# Patient Record
(demographics unavailable — no encounter records)

---

## 2024-12-19 NOTE — ASSESSMENT
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  Patient was referred for management.  Patient has completed her childbearing age and desire.  Discussed with patient the management option including surgery for removal of the uterus with cervix bilateral fallopian tubes and preservation of the ovaries.  Explained to the patient such procedure will result in cessation of menses however she will not be menopausal.  They are evidence that post hysterectomy, patient may experience early menopause due to ovarian failure.  Additionally we discussed nonsurgical option however the referring physician and the patient agreed and wished to proceed with definitive management.  Explained to the patient surgery will be performed by minimally invasive/robotic approach, however surgery has inherent risks including but not limited to infection, bleeding which may require blood transfusion, DVT and or PE and although all measures will be taken to minimize these risks that cannot be eliminated 100% of the time.  Additionally there is a risk of injury to surrounding structures such as bladder, ureters, bowel or blood vessels and these injuries will be repaired immediately however some injuries may not present until postoperatively and may require reoperations.  The benefit of surgery is to exclude invasive cancer of the cervix and if cancer is not diagnosed during this procedure, removing the cervix will eliminate development of cervix cancer in the future.  Understanding above, patient elected to proceed with recommendation will be scheduled for robotic hysterectomy with bilateral salpingectomy and vaginal colpopexy. Above discussed with patient with the help of an  who speaks the patient's native language. 60 minutes was spent with the patient during this visit.

## 2024-12-19 NOTE — HISTORY OF PRESENT ILLNESS
[FreeTextEntry1] : 45-year-old female, para 1, status post LEEP cone of the cervix for high-grade cervical dysplasia, final pathology revealed adenocarcinoma in situ with positive margin and clear endocervical margin.  Patient was referred for management.

## 2024-12-19 NOTE — PAST MEDICAL HISTORY
[Menstruating] : The patient is menstruating [Definite ___ (Date)] : the last menstrual period was [unfilled] [Normal Amount/Duration] : it was of a normal amount and duration [Normal Duration] : the duration was normal [Regular Cycle Intervals] : have been regular

## 2024-12-19 NOTE — OB HISTORY
[Total Preg ___] : : [unfilled] [Vaginal ___] : [unfilled] vaginal delivery(s) [Definite ___ (Date)] : the last menstrual period was [unfilled] [Normal Amount/Duration] : was of a normal amount and duration [Regular Cycle Intervals] : periods have been regular

## 2024-12-19 NOTE — PHYSICAL EXAM
[Chaperone Present] : A chaperone was present in the examining room during all aspects of the physical examination [31908] : A chaperone was present during the pelvic exam. [FreeTextEntry2] : Tere [Normal] : Recto-Vaginal Exam: Normal [de-identified] : Evidence of scarring from the cone biopsy/LEEP noted [Fully active, able to carry on all pre-disease performance without restriction] : Status 0 - Fully active, able to carry on all pre-disease performance without restriction

## 2025-02-06 NOTE — ASSESSMENT
[FreeTextEntry1] : 46-year-old female, status post robotic hysterectomy with bilateral salpingectomy for adenocarcinoma in situ of the cervix, final pathology revealed residual adenocarcinoma in situ of the cervix and no invasive cancer.  Discussed results with patient explained to the patient she still has both ovaries in situ and should follow-up with me annually.

## 2025-02-06 NOTE — REASON FOR VISIT
[Post Op] : post op visit [de-identified] : 1/24/25 [de-identified] : Patient is here for result discussion and postop examination, patient reports no complaints, surgical incision scar appears to be closed, dry and intact, no erythema induration noted.